# Patient Record
Sex: FEMALE | Race: WHITE | NOT HISPANIC OR LATINO | Employment: OTHER | URBAN - METROPOLITAN AREA
[De-identification: names, ages, dates, MRNs, and addresses within clinical notes are randomized per-mention and may not be internally consistent; named-entity substitution may affect disease eponyms.]

---

## 2020-07-09 ENCOUNTER — PREPPED CHART (OUTPATIENT)
Dept: URBAN - METROPOLITAN AREA CLINIC 25 | Facility: CLINIC | Age: 60
End: 2020-07-09

## 2021-07-23 ASSESSMENT — TONOMETRY
OD_IOP_MMHG: 14
OS_IOP_MMHG: 13

## 2021-07-26 ENCOUNTER — ESTABLISHED COMPREHENSIVE EXAM (OUTPATIENT)
Dept: URBAN - METROPOLITAN AREA CLINIC 25 | Facility: CLINIC | Age: 61
End: 2021-07-26

## 2021-07-26 DIAGNOSIS — H43.813: ICD-10-CM

## 2021-07-26 DIAGNOSIS — H40.013: ICD-10-CM

## 2021-07-26 DIAGNOSIS — H52.4: ICD-10-CM

## 2021-07-26 PROCEDURE — 92014 COMPRE OPH EXAM EST PT 1/>: CPT

## 2021-07-26 PROCEDURE — 92499RSE RETINAL SCREENING ELECTIVE

## 2021-07-26 PROCEDURE — 92015 DETERMINE REFRACTIVE STATE: CPT

## 2021-07-26 ASSESSMENT — KERATOMETRY
OS_AXISANGLE2_DEGREES: 78
OS_AXISANGLE_DEGREES: 168
OD_AXISANGLE_DEGREES: 1
OD_K2POWER_DIOPTERS: 42.00
OD_AXISANGLE2_DEGREES: 91
OS_K1POWER_DIOPTERS: 42.25
OD_K1POWER_DIOPTERS: 41.75
OS_K2POWER_DIOPTERS: 42.50

## 2021-07-26 ASSESSMENT — TONOMETRY
OS_IOP_MMHG: 19
OD_IOP_MMHG: 16

## 2021-07-26 ASSESSMENT — VISUAL ACUITY
OD_CC: 20/20
OS_CC: 20/20

## 2022-05-16 NOTE — PATIENT DISCUSSION
hx CME s/p Focal laser. Discussed the importance of blood sugar control in the prevention of ocular complications.

## 2022-05-16 NOTE — PATIENT DISCUSSION
[facility] monitoring, AREDS2 vitamins, no smoking, green leafy vegetables discussed.

## 2022-07-27 ENCOUNTER — ESTABLISHED PATIENT (OUTPATIENT)
Dept: URBAN - METROPOLITAN AREA CLINIC 25 | Facility: CLINIC | Age: 62
End: 2022-07-27

## 2022-07-27 DIAGNOSIS — H52.4: ICD-10-CM

## 2022-07-27 DIAGNOSIS — H40.013: ICD-10-CM

## 2022-07-27 DIAGNOSIS — H43.813: ICD-10-CM

## 2022-07-27 DIAGNOSIS — H52.13: ICD-10-CM

## 2022-07-27 DIAGNOSIS — H52.223: ICD-10-CM

## 2022-07-27 PROCEDURE — 92499RSE RETINAL SCREENING ELECTIVE

## 2022-07-27 PROCEDURE — 92015 DETERMINE REFRACTIVE STATE: CPT

## 2022-07-27 PROCEDURE — 92014 COMPRE OPH EXAM EST PT 1/>: CPT

## 2022-07-27 ASSESSMENT — KERATOMETRY
OD_AXISANGLE_DEGREES: 1
OS_K2POWER_DIOPTERS: 42.50
OS_AXISANGLE_DEGREES: 168
OS_AXISANGLE2_DEGREES: 78
OS_K1POWER_DIOPTERS: 42.00
OD_K1POWER_DIOPTERS: 42.00
OS_AXISANGLE_DEGREES: 160
OD_AXISANGLE2_DEGREES: 90
OD_K2POWER_DIOPTERS: 42.00
OS_AXISANGLE2_DEGREES: 70
OD_AXISANGLE2_DEGREES: 91
OS_K1POWER_DIOPTERS: 42.25
OD_AXISANGLE_DEGREES: 180
OD_K1POWER_DIOPTERS: 41.75

## 2022-07-27 ASSESSMENT — TONOMETRY
OD_IOP_MMHG: 18
OS_IOP_MMHG: 17

## 2022-07-27 ASSESSMENT — VISUAL ACUITY
OD_CC: 20/20
OS_CC: 20/20

## 2023-05-01 ENCOUNTER — EMERGENCY VISIT (OUTPATIENT)
Dept: URBAN - METROPOLITAN AREA CLINIC 25 | Facility: CLINIC | Age: 63
End: 2023-05-01

## 2023-05-01 DIAGNOSIS — H43.813: ICD-10-CM

## 2023-05-01 DIAGNOSIS — H43.393: ICD-10-CM

## 2023-05-01 PROCEDURE — 92014 COMPRE OPH EXAM EST PT 1/>: CPT

## 2023-05-01 ASSESSMENT — KERATOMETRY
OD_AXISANGLE_DEGREES: 1
OD_K2POWER_DIOPTERS: 42.00
OS_AXISANGLE_DEGREES: 160
OS_K1POWER_DIOPTERS: 42.00
OS_AXISANGLE_DEGREES: 168
OS_AXISANGLE2_DEGREES: 70
OD_K1POWER_DIOPTERS: 42.00
OS_K2POWER_DIOPTERS: 42.50
OS_K1POWER_DIOPTERS: 42.25
OD_K1POWER_DIOPTERS: 41.75
OD_AXISANGLE2_DEGREES: 90
OD_AXISANGLE_DEGREES: 180
OD_AXISANGLE2_DEGREES: 91
OS_AXISANGLE2_DEGREES: 78

## 2023-05-01 ASSESSMENT — VISUAL ACUITY
OD_CC: 20/20
OS_CC: 20/20-1

## 2023-07-21 ENCOUNTER — ESTABLISHED PATIENT (OUTPATIENT)
Dept: URBAN - METROPOLITAN AREA CLINIC 25 | Facility: CLINIC | Age: 63
End: 2023-07-21

## 2023-07-21 DIAGNOSIS — H52.4: ICD-10-CM

## 2023-07-21 DIAGNOSIS — H43.813: ICD-10-CM

## 2023-07-21 DIAGNOSIS — H52.223: ICD-10-CM

## 2023-07-21 DIAGNOSIS — H52.13: ICD-10-CM

## 2023-07-21 DIAGNOSIS — H43.393: ICD-10-CM

## 2023-07-21 PROCEDURE — 92499RS OCT RETINAL SCREENING, ELECTIVE

## 2023-07-21 PROCEDURE — 92015 DETERMINE REFRACTIVE STATE: CPT

## 2023-07-21 PROCEDURE — 92014 COMPRE OPH EXAM EST PT 1/>: CPT

## 2023-07-21 ASSESSMENT — KERATOMETRY
OD_K1POWER_DIOPTERS: 42.00
OS_AXISANGLE_DEGREES: 168
OS_AXISANGLE_DEGREES: 160
OS_K2POWER_DIOPTERS: 42.50
OD_AXISANGLE2_DEGREES: 178
OD_AXISANGLE2_DEGREES: 90
OS_AXISANGLE2_DEGREES: 72
OD_AXISANGLE_DEGREES: 180
OD_AXISANGLE2_DEGREES: 91
OS_K1POWER_DIOPTERS: 42.00
OD_K1POWER_DIOPTERS: 41.75
OS_AXISANGLE2_DEGREES: 78
OS_AXISANGLE2_DEGREES: 70
OD_AXISANGLE_DEGREES: 88
OD_AXISANGLE_DEGREES: 1
OD_K2POWER_DIOPTERS: 42.00
OS_K1POWER_DIOPTERS: 42.25
OS_AXISANGLE_DEGREES: 162

## 2023-07-21 ASSESSMENT — VISUAL ACUITY
OS_CC: 20/20
OD_CC: 20/20-1

## 2023-07-21 ASSESSMENT — TONOMETRY
OS_IOP_MMHG: 19
OD_IOP_MMHG: 16

## 2024-07-08 ENCOUNTER — ESTABLISHED PATIENT (OUTPATIENT)
Dept: URBAN - METROPOLITAN AREA CLINIC 25 | Facility: CLINIC | Age: 64
End: 2024-07-08

## 2024-07-08 DIAGNOSIS — H43.813: ICD-10-CM

## 2024-07-08 DIAGNOSIS — H52.13: ICD-10-CM

## 2024-07-08 DIAGNOSIS — H40.013: ICD-10-CM

## 2024-07-08 DIAGNOSIS — H43.393: ICD-10-CM

## 2024-07-08 DIAGNOSIS — H52.223: ICD-10-CM

## 2024-07-08 DIAGNOSIS — H52.4: ICD-10-CM

## 2024-07-08 PROCEDURE — 92014 COMPRE OPH EXAM EST PT 1/>: CPT

## 2024-07-08 PROCEDURE — 92499RS OCT RETINAL SCREENING, ELECTIVE

## 2024-07-08 PROCEDURE — 92015 DETERMINE REFRACTIVE STATE: CPT

## 2024-07-08 ASSESSMENT — KERATOMETRY
OD_K1POWER_DIOPTERS: 41.75
OD_K2POWER_DIOPTERS: 42.00
OD_AXISANGLE_DEGREES: 1
OD_K1POWER_DIOPTERS: 42.00
OS_AXISANGLE_DEGREES: 160
OS_AXISANGLE2_DEGREES: 70
OS_AXISANGLE_DEGREES: 180
OS_AXISANGLE2_DEGREES: 78
OS_K2POWER_DIOPTERS: 42.50
OD_AXISANGLE_DEGREES: 88
OD_AXISANGLE2_DEGREES: 178
OS_AXISANGLE2_DEGREES: 90
OD_AXISANGLE2_DEGREES: 90
OS_K2POWER_DIOPTERS: 42.25
OS_AXISANGLE_DEGREES: 168
OD_AXISANGLE2_DEGREES: 91
OS_K1POWER_DIOPTERS: 42.25
OS_AXISANGLE2_DEGREES: 72
OS_K1POWER_DIOPTERS: 42.00
OD_AXISANGLE_DEGREES: 180
OS_AXISANGLE_DEGREES: 162

## 2024-07-08 ASSESSMENT — TONOMETRY
OS_IOP_MMHG: 17
OD_IOP_MMHG: 15

## 2024-07-08 ASSESSMENT — VISUAL ACUITY
OD_CC: 20/20
OS_CC: 20/20

## 2025-07-14 ENCOUNTER — COMPREHENSIVE EXAM (OUTPATIENT)
Age: 65
End: 2025-07-14

## 2025-07-14 DIAGNOSIS — H52.4: ICD-10-CM

## 2025-07-14 DIAGNOSIS — H43.393: ICD-10-CM

## 2025-07-14 DIAGNOSIS — H43.813: ICD-10-CM

## 2025-07-14 PROCEDURE — 92015 DETERMINE REFRACTIVE STATE: CPT

## 2025-07-14 PROCEDURE — 92014 COMPRE OPH EXAM EST PT 1/>: CPT

## 2025-07-14 PROCEDURE — 92499RS OCT RETINAL SCREENING, ELECTIVE
